# Patient Record
Sex: MALE | Race: WHITE | NOT HISPANIC OR LATINO
[De-identification: names, ages, dates, MRNs, and addresses within clinical notes are randomized per-mention and may not be internally consistent; named-entity substitution may affect disease eponyms.]

---

## 2017-01-31 ENCOUNTER — APPOINTMENT (OUTPATIENT)
Dept: RADIATION ONCOLOGY | Facility: CLINIC | Age: 67
End: 2017-01-31

## 2017-01-31 VITALS
DIASTOLIC BLOOD PRESSURE: 87 MMHG | SYSTOLIC BLOOD PRESSURE: 147 MMHG | OXYGEN SATURATION: 96 % | HEART RATE: 76 BPM | RESPIRATION RATE: 18 BRPM

## 2018-01-30 ENCOUNTER — APPOINTMENT (OUTPATIENT)
Dept: RADIATION ONCOLOGY | Facility: CLINIC | Age: 68
End: 2018-01-30
Payer: MEDICARE

## 2018-01-30 VITALS
BODY MASS INDEX: 29.16 KG/M2 | OXYGEN SATURATION: 98 % | WEIGHT: 175 LBS | TEMPERATURE: 97.5 F | HEIGHT: 65 IN | RESPIRATION RATE: 18 BRPM | HEART RATE: 65 BPM

## 2018-01-30 PROCEDURE — 99213 OFFICE O/P EST LOW 20 MIN: CPT

## 2018-02-28 ENCOUNTER — APPOINTMENT (OUTPATIENT)
Dept: UROLOGY | Facility: CLINIC | Age: 68
End: 2018-02-28
Payer: MEDICARE

## 2018-02-28 VITALS
DIASTOLIC BLOOD PRESSURE: 79 MMHG | SYSTOLIC BLOOD PRESSURE: 153 MMHG | OXYGEN SATURATION: 98 % | RESPIRATION RATE: 14 BRPM | HEART RATE: 87 BPM

## 2018-02-28 PROCEDURE — 99204 OFFICE O/P NEW MOD 45 MIN: CPT

## 2018-03-14 ENCOUNTER — APPOINTMENT (OUTPATIENT)
Dept: UROLOGY | Facility: CLINIC | Age: 68
End: 2018-03-14
Payer: MEDICARE

## 2018-03-14 VITALS — OXYGEN SATURATION: 98 % | SYSTOLIC BLOOD PRESSURE: 117 MMHG | DIASTOLIC BLOOD PRESSURE: 60 MMHG | TEMPERATURE: 98 F

## 2018-03-14 DIAGNOSIS — Z01.818 ENCOUNTER FOR OTHER PREPROCEDURAL EXAMINATION: ICD-10-CM

## 2018-03-14 PROCEDURE — 81003 URINALYSIS AUTO W/O SCOPE: CPT | Mod: QW

## 2018-03-14 PROCEDURE — 52000 CYSTOURETHROSCOPY: CPT

## 2018-03-15 ENCOUNTER — APPOINTMENT (OUTPATIENT)
Dept: UROLOGY | Facility: CLINIC | Age: 68
End: 2018-03-15
Payer: MEDICARE

## 2018-03-15 VITALS — SYSTOLIC BLOOD PRESSURE: 131 MMHG | HEART RATE: 73 BPM | DIASTOLIC BLOOD PRESSURE: 75 MMHG

## 2018-03-15 PROCEDURE — 51784 ANAL/URINARY MUSCLE STUDY: CPT

## 2018-03-15 PROCEDURE — 51798 US URINE CAPACITY MEASURE: CPT | Mod: 59

## 2018-03-15 PROCEDURE — 51728 CYSTOMETROGRAM W/VP: CPT

## 2018-03-15 PROCEDURE — 51797 INTRAABDOMINAL PRESSURE TEST: CPT

## 2018-03-17 LAB
BILIRUB UR QL STRIP: NEGATIVE
GLUCOSE UR-MCNC: NEGATIVE
HCG UR QL: 0.2 EU/DL
HGB UR QL STRIP.AUTO: NEGATIVE
KETONES UR-MCNC: NEGATIVE
LEUKOCYTE ESTERASE UR QL STRIP: NEGATIVE
NITRITE UR QL STRIP: NEGATIVE
PH UR STRIP: 6
PROT UR STRIP-MCNC: NEGATIVE
SP GR UR STRIP: 1.01

## 2019-01-29 ENCOUNTER — APPOINTMENT (OUTPATIENT)
Dept: RADIATION ONCOLOGY | Facility: CLINIC | Age: 69
End: 2019-01-29
Payer: MEDICARE

## 2019-01-29 VITALS
RESPIRATION RATE: 14 BRPM | DIASTOLIC BLOOD PRESSURE: 84 MMHG | BODY MASS INDEX: 28.49 KG/M2 | HEART RATE: 74 BPM | SYSTOLIC BLOOD PRESSURE: 154 MMHG | OXYGEN SATURATION: 95 % | WEIGHT: 171 LBS | HEIGHT: 65 IN

## 2019-01-29 PROCEDURE — 99213 OFFICE O/P EST LOW 20 MIN: CPT

## 2019-01-30 NOTE — LETTER CLOSING
[Consult Closing:] : Thank you for allowing me to participate in the care of this patient.  If you have any questions, please do not hesitate to contact me. [Sincerely yours,] : Sincerely yours, [FreeTextEntry3] : Jamarcus Bee MD

## 2019-01-30 NOTE — DISEASE MANAGEMENT
[Pathological] : TNM Stage: p [IIB] : IIB [FreeTextEntry4] : [place] 8 adenocarcinoma prostate  [TTNM] : 2c [NTNM] : 0 [MTNM] : 0

## 2019-01-30 NOTE — REVIEW OF SYSTEMS
[Nocturia] : nocturia [IPSS Score (0-40): ___] : IPSS score: [unfilled] [EPIC-CP Score (0-60): ___] : EPIC-CP score: [unfilled] [Anal Pain: Grade 0] : Anal Pain: Grade 0 [Constipation: Grade 0] : Constipation: Grade 0 [Diarrhea: Grade 0] : Diarrhea: Grade 0 [Rectal Pain: Grade 0] : Rectal Pain: Grade 0 [Edema Limbs: Grade 0] : Edema Limbs: Grade 0  [Fatigue: Grade 0] : Fatigue: Grade 0 [Localized Edema: Grade 0] : Localized Edema: Grade 0  [Neck Edema: Grade 0] : Neck Edema: Grade 0 [Hematuria: Grade 0] : Hematuria: Grade 0 [Urinary Incontinence: Grade 2 - Spontaneous; pads indicated; limiting instrumental ADL] : Urinary Incontinence: Grade 2 - Spontaneous; pads indicated; limiting instrumental ADL [Urinary Retention: Grade 0] : Urinary Retention: Grade 0 [Urinary Tract Pain: Grade 0] : Urinary Tract Pain: Grade 0 [Urinary Urgency: Grade 0] : Urinary Urgency: Grade 0 [Urinary Frequency: Grade 0] : Urinary Frequency: Grade 0 [Erectile Dysfunction: Grade 2 - Decrease in erectile function (frequency/rigidity of erections), erectile intervention indicated, (e.g., medication or mechanical devices such as penile pump)] : Erectile Dysfunction: Grade 2 - Decrease in erectile function (frequency/rigidity of erections), erectile intervention indicated, (e.g., medication or mechanical devices such as penile pump) [Ejaculation Disorder: Grade 2 - Anejaculation or retrograde ejaculation] : Ejaculation Disorder: Grade 2 - Anejaculation or retrograde ejaculation [Negative] : Allergic/Immunologic [FreeTextEntry8] : wears urinary external device during the day [FreeTextEntry2] : uses urinary catheter to leg bag drainage

## 2019-01-30 NOTE — PHYSICAL EXAM
[General Appearance - Well Nourished] : well nourished [General Appearance - Alert] : alert [General Appearance - In No Acute Distress] : in no acute distress [Affect] : the affect was normal [Mood] : the mood was normal [Not Anxious] : not anxious [Normal] : normal spine exam without palpable tenderness, no kyphosis or scoliosis [FreeTextEntry1] : deferred [de-identified] : wearing [place] cstheter

## 2019-01-30 NOTE — LETTER GREETING
[Dear Doctor] : Dear Doctor, [Follow-Up] : Your patient, [unfilled] was seen in my office today for follow-up [Please see my note below.] : Please see my note below. [FreeTextEntry2] : Dorota Hodgson MD\luis Gresham MD

## 2019-01-30 NOTE — HISTORY OF PRESENT ILLNESS
[FreeTextEntry1] : This 67 year-old male returns today s/p radiation medicine (7,020 cGy) for [id number] [place] 7 (4+3) adenocarcinoma prostate completed 11/12/12. PSA  1/10/19 -- 0.0 ng/ml, IPSS 8 EPIC 14.  Reports urinary incontinence when standing up, uses a urinary catheter to leg bag drainage, continues since surgery.  Denies hematuria, bony pain, fatigue & weight loss.\par

## 2020-02-04 ENCOUNTER — APPOINTMENT (OUTPATIENT)
Dept: RADIATION ONCOLOGY | Facility: CLINIC | Age: 70
End: 2020-02-04
Payer: MEDICARE

## 2020-02-04 VITALS
OXYGEN SATURATION: 95 % | DIASTOLIC BLOOD PRESSURE: 84 MMHG | SYSTOLIC BLOOD PRESSURE: 150 MMHG | HEART RATE: 75 BPM | TEMPERATURE: 98 F | BODY MASS INDEX: 28.99 KG/M2 | WEIGHT: 174 LBS | HEIGHT: 65 IN

## 2020-02-04 DIAGNOSIS — N52.9 MALE ERECTILE DYSFUNCTION, UNSPECIFIED: ICD-10-CM

## 2020-02-04 DIAGNOSIS — N39.3 STRESS INCONTINENCE (FEMALE) (MALE): ICD-10-CM

## 2020-02-04 PROCEDURE — 99213 OFFICE O/P EST LOW 20 MIN: CPT

## 2020-02-04 NOTE — HISTORY OF PRESENT ILLNESS
[FreeTextEntry1] : This 69 year-old male returns today s/p radiation medicine (7,020 cGy) for [id number] [place] 7 (4+3) adenocarcinoma prostate completed 11/12/12. \par \par Recent PSA done on 1/15/20 ---  0.1 ng/ml\par PSA  1/10/19 -- 0.0 ng/ml, \par  IPSS 0 EPIC 0 .  Chronic urinary incontinence post prostatectomy and no bladder control. uses external urinary [place] catheter to leg bag drainage, continues since surgery.  Denies hematuria, bony pain, fatigue & weight loss.\par \par Has recently changed medical insurance and plans to pursue urological surgery this year to restore continence.\par

## 2020-02-04 NOTE — VITALS
[Maximal Pain Intensity: 0/10] : 0/10 [ECOG Performance Status: 1 - Restricted in physically strenuous activity but ambulatory and able to carry out work of a light or sedentary nature] : Performance Status: 1 - Restricted in physically strenuous activity but ambulatory and able to carry out work of a light or sedentary nature, e.g., light house work, office work [Least Pain Intensity: 0/10] : 0/10 [80: Normal activity with effort; some signs or symptoms of disease.] : 80: Normal activity with effort; some signs or symptoms of disease.

## 2020-02-04 NOTE — REASON FOR VISIT
[Prostate Cancer] : prostate cancer [Routine Follow-Up] : routine follow-up visit for [Other: _____] : [unfilled] Rotation Flap Text: The defect edges were debeveled with a #15 scalpel blade.  Given the location of the defect, shape of the defect and the proximity to free margins a rotation flap was deemed most appropriate.  Using a sterile surgical marker, an appropriate rotation flap was drawn incorporating the defect and placing the expected incisions within the relaxed skin tension lines where possible.    The area thus outlined was incised deep to adipose tissue with a #15 scalpel blade.  The skin margins were undermined to an appropriate distance in all directions utilizing iris scissors.

## 2020-02-04 NOTE — DISEASE MANAGEMENT
[Pathological] : TNM Stage: p [IIB] : IIB [FreeTextEntry4] : [place] 8 adenocarcinoma prostate  [TTNM] : 2c [MTNM] : 0 [de-identified] : 7070cGy [NTNM] : 0 [de-identified] : prostate bed

## 2020-02-04 NOTE — PHYSICAL EXAM
[General Appearance - Well Nourished] : well nourished [General Appearance - In No Acute Distress] : in no acute distress [General Appearance - Alert] : alert [Mood] : the mood was normal [Normal] : no focal deficits [Affect] : the affect was normal [Not Anxious] : not anxious [FreeTextEntry1] : deferred [de-identified] : wearing [place] catheter

## 2020-02-04 NOTE — REVIEW OF SYSTEMS
[Nocturia] : nocturia [Negative] : Allergic/Immunologic [Anal Pain: Grade 0] : Anal Pain: Grade 0 [Constipation: Grade 0] : Constipation: Grade 0 [Rectal Pain: Grade 0] : Rectal Pain: Grade 0 [Diarrhea: Grade 0] : Diarrhea: Grade 0 [Edema Limbs: Grade 0] : Edema Limbs: Grade 0  [Fatigue: Grade 0] : Fatigue: Grade 0 [Neck Edema: Grade 0] : Neck Edema: Grade 0 [Localized Edema: Grade 0] : Localized Edema: Grade 0  [Hematuria: Grade 0] : Hematuria: Grade 0 [Urinary Retention: Grade 0] : Urinary Retention: Grade 0 [Urinary Tract Pain: Grade 0] : Urinary Tract Pain: Grade 0 [Urinary Frequency: Grade 0] : Urinary Frequency: Grade 0 [Erectile Dysfunction: Grade 2 - Decrease in erectile function (frequency/rigidity of erections), erectile intervention indicated, (e.g., medication or mechanical devices such as penile pump)] : Erectile Dysfunction: Grade 2 - Decrease in erectile function (frequency/rigidity of erections), erectile intervention indicated, (e.g., medication or mechanical devices such as penile pump) [Urinary Urgency: Grade 0] : Urinary Urgency: Grade 0 [Ejaculation Disorder: Grade 2 - Anejaculation or retrograde ejaculation] : Ejaculation Disorder: Grade 2 - Anejaculation or retrograde ejaculation [Urinary Incontinence: Grade 3 - Intervention indicated (e.g., clamp, collagen injections); operative intervention indicated; limiting self care ADL] : Urinary Incontinence: Grade 3 - Intervention indicated (e.g., clamp, collagen injections); operative intervention indicated; limiting self care ADL [IPSS Score (0-40): ___] : IPSS score: [unfilled] [EPIC-CP Score (0-60): ___] : EPIC-CP score: [unfilled] [FreeTextEntry8] : wears urinary external device during the day for chronic incontinence post prostatectomy [FreeTextEntry2] : uses external urinary catheter to leg bag drainage

## 2021-03-11 ENCOUNTER — APPOINTMENT (OUTPATIENT)
Dept: RADIATION ONCOLOGY | Facility: CLINIC | Age: 71
End: 2021-03-11
Payer: MEDICARE

## 2021-03-11 VITALS
OXYGEN SATURATION: 97 % | RESPIRATION RATE: 16 BRPM | HEART RATE: 84 BPM | BODY MASS INDEX: 29.42 KG/M2 | DIASTOLIC BLOOD PRESSURE: 82 MMHG | SYSTOLIC BLOOD PRESSURE: 133 MMHG | WEIGHT: 176.8 LBS

## 2021-03-11 PROCEDURE — 99072 ADDL SUPL MATRL&STAF TM PHE: CPT

## 2021-03-11 PROCEDURE — 99212 OFFICE O/P EST SF 10 MIN: CPT

## 2021-03-12 NOTE — PHYSICAL EXAM
[General Appearance - Well Nourished] : well nourished [General Appearance - Alert] : alert [General Appearance - In No Acute Distress] : in no acute distress [Affect] : the affect was normal [Mood] : the mood was normal [Not Anxious] : not anxious [Normal] : normal spine exam without palpable tenderness, no kyphosis or scoliosis [FreeTextEntry1] : deferred [de-identified] : [place] Catheter in place

## 2021-03-12 NOTE — ASSESSMENT
[No evidence of disease] : No evidence of disease [FreeTextEntry1] : treatment related sequelae status quo.

## 2021-03-12 NOTE — HISTORY OF PRESENT ILLNESS
[FreeTextEntry1] : This 70 year-old male will be seen today s/p radiation medicine (7,020 cGy) for [id number] Pilo 7 (4+3) adenocarcinoma of prostate, completed 11/12/12. \par \par PSA trend:\par 1/21/21 -- <0.1\par 1/15/20 --  0.1 ng/ml\par 1/10/19 -- 0.0 ng/ml, \par  \par Reports chronic urinary incontinence post prostatectomy with no bladder control.  Uses external urinary [place] catheter to leg bag drainage since surgery.  Denies hematuria, pain, fatigue & weight loss.\par \par Was planing to pursue urological surgery last year to restore continence but delayed due to pandemic.\par

## 2021-03-12 NOTE — REVIEW OF SYSTEMS
[Negative] : Allergic/Immunologic [Anal Pain: Grade 0] : Anal Pain: Grade 0 [Constipation: Grade 0] : Constipation: Grade 0 [Diarrhea: Grade 0] : Diarrhea: Grade 0 [Rectal Pain: Grade 0] : Rectal Pain: Grade 0 [Edema Limbs: Grade 0] : Edema Limbs: Grade 0  [Fatigue: Grade 0] : Fatigue: Grade 0 [Localized Edema: Grade 0] : Localized Edema: Grade 0  [Neck Edema: Grade 0] : Neck Edema: Grade 0 [Hematuria: Grade 0] : Hematuria: Grade 0 [Urinary Incontinence: Grade 2 - Spontaneous; pads indicated; limiting instrumental ADL] : Urinary Incontinence: Grade 2 - Spontaneous; pads indicated; limiting instrumental ADL [Urinary Retention: Grade 0] : Urinary Retention: Grade 0 [Urinary Tract Pain: Grade 0] : Urinary Tract Pain: Grade 0 [Urinary Urgency: Grade 0] : Urinary Urgency: Grade 0 [Urinary Frequency: Grade 0] : Urinary Frequency: Grade 0 [Erectile Dysfunction: Grade 2 - Decrease in erectile function (frequency/rigidity of erections), erectile intervention indicated, (e.g., medication or mechanical devices such as penile pump)] : Erectile Dysfunction: Grade 2 - Decrease in erectile function (frequency/rigidity of erections), erectile intervention indicated, (e.g., medication or mechanical devices such as penile pump) [Ejaculation Disorder: Grade 2 - Anejaculation or retrograde ejaculation] : Ejaculation Disorder: Grade 2 - Anejaculation or retrograde ejaculation [IPSS Score (0-40): ___] : IPSS score: [unfilled] [EPIC-CP Score (0-60): ___] : EPIC-CP score: [unfilled] [FreeTextEntry7] : 3-4 small bowel movements daily in am [FreeTextEntry8] : wears urinary external device 24/7 for chronic incontinence post prostatectomy [FreeTextEntry2] : uses external urinary catheter to leg bag drainage

## 2021-03-12 NOTE — DISEASE MANAGEMENT
[Pathological] : TNM Stage: p [IIB] : IIB [FreeTextEntry4] : [place] 8 adenocarcinoma prostate  [TTNM] : 2c [NTNM] : 0 [MTNM] : 0 [de-identified] : 7020 cGy [de-identified] : prostate bed

## 2022-03-15 ENCOUNTER — APPOINTMENT (OUTPATIENT)
Dept: RADIATION ONCOLOGY | Facility: CLINIC | Age: 72
End: 2022-03-15
Payer: MEDICARE

## 2022-03-15 VITALS
DIASTOLIC BLOOD PRESSURE: 84 MMHG | HEART RATE: 76 BPM | OXYGEN SATURATION: 98 % | BODY MASS INDEX: 29.12 KG/M2 | SYSTOLIC BLOOD PRESSURE: 154 MMHG | WEIGHT: 175 LBS | RESPIRATION RATE: 16 BRPM

## 2022-03-15 PROCEDURE — 99212 OFFICE O/P EST SF 10 MIN: CPT

## 2022-03-16 NOTE — REVIEW OF SYSTEMS
[IPSS Score (0-40): ___] : IPSS score: [unfilled] [EPIC-CP Score (0-60): ___] : EPIC-CP score: [unfilled] [Negative] : Allergic/Immunologic [Anal Pain: Grade 0] : Anal Pain: Grade 0 [Constipation: Grade 0] : Constipation: Grade 0 [Diarrhea: Grade 0] : Diarrhea: Grade 0 [Rectal Pain: Grade 0] : Rectal Pain: Grade 0 [Hematuria: Grade 0] : Hematuria: Grade 0 [Urinary Incontinence: Grade 2 - Spontaneous; pads indicated; limiting instrumental ADL] : Urinary Incontinence: Grade 2 - Spontaneous; pads indicated; limiting instrumental ADL [Urinary Retention: Grade 0] : Urinary Retention: Grade 0 [Urinary Tract Pain: Grade 0] : Urinary Tract Pain: Grade 0 [Urinary Urgency: Grade 0] : Urinary Urgency: Grade 0 [Urinary Frequency: Grade 0] : Urinary Frequency: Grade 0 [Erectile Dysfunction: Grade 2 - Decrease in erectile function (frequency/rigidity of erections), erectile intervention indicated, (e.g., medication or mechanical devices such as penile pump)] : Erectile Dysfunction: Grade 2 - Decrease in erectile function (frequency/rigidity of erections), erectile intervention indicated, (e.g., medication or mechanical devices such as penile pump) [Ejaculation Disorder: Grade 2 - Anejaculation or retrograde ejaculation] : Ejaculation Disorder: Grade 2 - Anejaculation or retrograde ejaculation [Edema Limbs: Grade 0] : Edema Limbs: Grade 0  [Fatigue: Grade 0] : Fatigue: Grade 0 [Localized Edema: Grade 0] : Localized Edema: Grade 0  [Neck Edema: Grade 0] : Neck Edema: Grade 0 [FreeTextEntry7] : 3-4 small bowel movements daily in am [FreeTextEntry8] : wears urinary external device 24/7 for chronic incontinence post prostatectomy [FreeTextEntry2] : uses external urinary catheter to leg bag drainage

## 2022-03-16 NOTE — ASSESSMENT
[No evidence of disease] : No evidence of disease [FreeTextEntry1] : treatment related sequelae status quo. Patient wants to pursue a remedy for incontinence that does not involve a foreign device.

## 2022-03-16 NOTE — HISTORY OF PRESENT ILLNESS
[FreeTextEntry1] :  71 y/o gentleman with hx of pT2 R0 N0 Pilo 3+4=7 prostate ca s/p RALRP 9/2011. Pt initially with undetectable PSA however daniela to 0.04 in 6/2012 and then 0/05. As a result, he was seen by rad med  and underwent salvage XRT (7020cGy)  completed in November2012.\par \par PSA trend:\par \par 2/23/22 -- 0.02 ng/mL\par 1/21/21 -- <0.1ng/mL\par 1/15/20 --  0.1 ng/ml\par 1/10/19 -- 0.0 ng/ml, \par  \par Reports chronic urinary incontinence post prostatectomy with no bladder control.  Uses external urinary [place] catheter to leg bag drainage since surgery.  Denies hematuria, pain, fatigue & weight loss.\par \par Was planing to pursue urological surgery last year to restore continence but delayed due to pandemic.\par

## 2022-03-16 NOTE — DISEASE MANAGEMENT
[2] : T2 [c] : c [0] : M0 [IIB] : IIB [Radical Prostatectomy] : Radical Prostatectomy [Radiation Therapy] : Radiation  Therapy [8] : [place] Score 8 [Pathological] : TNM Stage: p [FreeTextEntry4] : [place] 8 adenocarcinoma prostate  [TTNM] : 2c [MTNM] : 0 [NTNM] : 0 [de-identified] : 7020 cGy [de-identified] : prostate bed

## 2022-03-16 NOTE — PHYSICAL EXAM
[General Appearance - Well Nourished] : well nourished [General Appearance - Alert] : alert [General Appearance - In No Acute Distress] : in no acute distress [Normal] : oriented to person, place and time, the affect was normal, the mood was normal and not anxious [Affect] : the affect was normal [Mood] : the mood was normal [Not Anxious] : not anxious [FreeTextEntry1] : deferred [de-identified] : [place] Catheter in place

## 2022-04-20 ENCOUNTER — APPOINTMENT (OUTPATIENT)
Dept: UROLOGY | Facility: CLINIC | Age: 72
End: 2022-04-20
Payer: MEDICARE

## 2022-04-20 VITALS
SYSTOLIC BLOOD PRESSURE: 153 MMHG | HEART RATE: 93 BPM | WEIGHT: 170 LBS | OXYGEN SATURATION: 95 % | BODY MASS INDEX: 28.32 KG/M2 | HEIGHT: 65 IN | DIASTOLIC BLOOD PRESSURE: 85 MMHG

## 2022-04-20 PROCEDURE — 99213 OFFICE O/P EST LOW 20 MIN: CPT

## 2022-04-20 NOTE — PHYSICAL EXAM
[Normal Appearance] : normal appearance [Well Groomed] : well groomed [General Appearance - In No Acute Distress] : no acute distress [Edema] : no peripheral edema [Abdomen Soft] : soft [Urethral Meatus] : meatus normal [Normal Station and Gait] : the gait and station were normal for the patient's age [Skin Color & Pigmentation] : normal skin color and pigmentation [] : no rash [No Focal Deficits] : no focal deficits [Oriented To Time, Place, And Person] : oriented to person, place, and time [Affect] : the affect was normal [No Palpable Adenopathy] : no palpable adenopathy

## 2022-04-27 NOTE — ASSESSMENT
[FreeTextEntry1] : Referred patient to Dr. Chon Cervantes for an initial consultation for bladder sphincter surgery.\par Contact information given to patient at time of visit and advised him to schedule an appointment.

## 2022-04-27 NOTE — HISTORY OF PRESENT ILLNESS
[None] : no symptoms [FreeTextEntry1] : 71 yo presents today for an initial visit for prostate cancer.\par s/p RALP 2011 (GS 7) performed by Dr. Davenport.\par Salvage radiation 11/2012 with Dr. Kapadia. \par Pt. is experiencing chronic urinary incontinence and has no bladder control.\par He uses an external urinary [place] catheter to leg bag and is here to discuss incontinence management and treatment.\par

## 2023-03-14 ENCOUNTER — APPOINTMENT (OUTPATIENT)
Dept: RADIATION ONCOLOGY | Facility: CLINIC | Age: 73
End: 2023-03-14
Payer: MEDICARE

## 2023-03-14 VITALS
SYSTOLIC BLOOD PRESSURE: 157 MMHG | DIASTOLIC BLOOD PRESSURE: 72 MMHG | HEART RATE: 63 BPM | BODY MASS INDEX: 29.62 KG/M2 | WEIGHT: 178 LBS | OXYGEN SATURATION: 98 % | RESPIRATION RATE: 16 BRPM

## 2023-03-14 DIAGNOSIS — Z90.79 ACQUIRED ABSENCE OF OTHER GENITAL ORGAN(S): ICD-10-CM

## 2023-03-14 PROCEDURE — 99213 OFFICE O/P EST LOW 20 MIN: CPT

## 2023-03-16 PROBLEM — Z90.79 HISTORY OF RADICAL PROSTATECTOMY: Status: ACTIVE | Noted: 2017-01-31

## 2023-03-16 NOTE — HISTORY OF PRESENT ILLNESS
[FreeTextEntry1] :  73 y/o gentleman with hx of pT2 R0 N0 Pilo 3+4=7 prostate ca s/p RALRP 9/2011. Pt initially with undetectable PSA however daniela to 0.04 in 6/2012 and then 0/05.   As a result, he underwent salvage XRT (7020cGy)  completed in November 2012.\par \par PSA trend:\par 2/28/23 -- 0.0 ng/mL\par 2/23/22 -- 0.02 ng/mL\par 1/21/21 -- <0.1ng/mL\par 1/15/20 --  0.1 ng/mL\par 1/10/19 -- 0.0 ng/mL\par  \par Reports chronic urinary incontinence post prostatectomy with no bladder control.  Uses external urinary [place] catheter to leg bag drainage since surgery.  Denies hematuria, pain, fatigue & weight loss.\par \par Was planing to pursue urological surgery last year to restore continence but delayed due to pandemic.\par

## 2023-03-16 NOTE — DISEASE MANAGEMENT
[2] : T2 [c] : c [0] : M0 [IIB] : IIB [Radical Prostatectomy] : Radical Prostatectomy [Radiation Therapy] : Radiation  Therapy [8] : [place] Score 8 [Pathological] : TNM Stage: p [FreeTextEntry4] : [place] 8 adenocarcinoma prostate  [NTNM] : 0 [TTNM] : 2c [MTNM] : 0 [de-identified] : 7020 cGy [de-identified] : prostate bed

## 2023-03-16 NOTE — PHYSICAL EXAM
[General Appearance - Well Nourished] : well nourished [General Appearance - Alert] : alert [General Appearance - In No Acute Distress] : in no acute distress [Normal] : oriented to person, place and time, the affect was normal, the mood was normal and not anxious [Affect] : the affect was normal [Mood] : the mood was normal [Not Anxious] : not anxious [FreeTextEntry1] : deferred [de-identified] : [place] Catheter in place d/t incontinence

## 2023-03-16 NOTE — ASSESSMENT
[No evidence of disease] : No evidence of disease [FreeTextEntry1] : Stable treatment related sequelae.

## 2024-06-05 ENCOUNTER — APPOINTMENT (OUTPATIENT)
Dept: RADIATION ONCOLOGY | Facility: CLINIC | Age: 74
End: 2024-06-05
Payer: MEDICARE

## 2024-06-05 ENCOUNTER — NON-APPOINTMENT (OUTPATIENT)
Age: 74
End: 2024-06-05

## 2024-06-05 VITALS
SYSTOLIC BLOOD PRESSURE: 138 MMHG | OXYGEN SATURATION: 96 % | RESPIRATION RATE: 16 BRPM | DIASTOLIC BLOOD PRESSURE: 79 MMHG | HEART RATE: 65 BPM

## 2024-06-05 DIAGNOSIS — N39.45 CONTINUOUS LEAKAGE: ICD-10-CM

## 2024-06-05 DIAGNOSIS — Z92.3 PERSONAL HISTORY OF IRRADIATION: ICD-10-CM

## 2024-06-05 DIAGNOSIS — Z85.46 PERSONAL HISTORY OF MALIGNANT NEOPLASM OF PROSTATE: ICD-10-CM

## 2024-06-05 PROCEDURE — 99213 OFFICE O/P EST LOW 20 MIN: CPT

## 2024-06-06 PROBLEM — Z92.3 PERSONAL HISTORY OF RADIATION THERAPY: Status: ACTIVE | Noted: 2017-01-31

## 2024-06-06 PROBLEM — Z85.46 PERSONAL HISTORY OF PROSTATE CANCER: Status: ACTIVE | Noted: 2017-01-31

## 2024-06-06 NOTE — REVIEW OF SYSTEMS
[IPSS Score (0-40): ___] : IPSS score: [unfilled] [EPIC-CP Score (0-60): ___] : EPIC-CP score: [unfilled] [Negative] : Allergic/Immunologic [Anal Pain: Grade 0] : Anal Pain: Grade 0 [Constipation: Grade 0] : Constipation: Grade 0 [Diarrhea: Grade 0] : Diarrhea: Grade 0 [Rectal Pain: Grade 0] : Rectal Pain: Grade 0 [Hematuria: Grade 0] : Hematuria: Grade 0 [Urinary Incontinence: Grade 2 - Spontaneous; pads indicated; limiting instrumental ADL] : Urinary Incontinence: Grade 2 - Spontaneous; pads indicated; limiting instrumental ADL [Urinary Retention: Grade 0] : Urinary Retention: Grade 0 [Urinary Tract Pain: Grade 0] : Urinary Tract Pain: Grade 0 [Urinary Urgency: Grade 0] : Urinary Urgency: Grade 0 [Urinary Frequency: Grade 0] : Urinary Frequency: Grade 0 [Erectile Dysfunction: Grade 2 - Decrease in erectile function (frequency/rigidity of erections), erectile intervention indicated, (e.g., medication or mechanical devices such as penile pump)] : Erectile Dysfunction: Grade 2 - Decrease in erectile function (frequency/rigidity of erections), erectile intervention indicated, (e.g., medication or mechanical devices such as penile pump) [Ejaculation Disorder: Grade 2 - Anejaculation or retrograde ejaculation] : Ejaculation Disorder: Grade 2 - Anejaculation or retrograde ejaculation [Edema Limbs: Grade 0] : Edema Limbs: Grade 0  [Fatigue: Grade 0] : Fatigue: Grade 0 [Localized Edema: Grade 0] : Localized Edema: Grade 0  [Neck Edema: Grade 0] : Neck Edema: Grade 0 [FreeTextEntry7] : 3-4 small bowel movements daily in am [FreeTextEntry8] : wears urinary external device ( [place] Catheter) 24/7 for chronic incontinence post prostatectomy [FreeTextEntry2] : uses external urinary catheter to leg bag drainage

## 2024-06-06 NOTE — PHYSICAL EXAM
[General Appearance - Well Nourished] : well nourished [Normal] : oriented to person, place and time, the affect was normal, the mood was normal and not anxious [Affect] : the affect was normal [Mood] : the mood was normal [Not Anxious] : not anxious [FreeTextEntry1] : deferred [de-identified] : [place] Catheter in place d/t incontinence

## 2024-06-06 NOTE — HISTORY OF PRESENT ILLNESS
[FreeTextEntry1] :  75 y/o gentleman with hx of pT2 R0 N0 Pilo 3+4=7 prostate adenocarcinoma, s/p RALRP 9/2011. Pt initially with undetectable PSA however daniela to 0.04 in 6/2012.   As a result, he underwent salvage XRT (7020cGy) completed in November 2012.  PSA trend: 2/28/24 -- 0.0 ng/mL 2/28/23 -- 0.0 ng/mL 2/23/22 -- 0.02 ng/mL 1/21/21 -- <0.1ng/mL 1/15/20 --  0.1 ng/mL 1/10/19 -- 0.0 ng/mL   Reports chronic urinary incontinence post prostatectomy with no bladder control.  Uses external urinary [place] catheter to leg bag drainage since surgery.  Denies hematuria, pain, fatigue & weight loss.

## 2024-06-06 NOTE — DISEASE MANAGEMENT
[2] : T2 [c] : c [0] : M0 [IIB] : IIB [Radical Prostatectomy] : Radical Prostatectomy [Radiation Therapy] : Radiation  Therapy [8] : [place] Score 8 [Pathological] : TNM Stage: p [FreeTextEntry4] : [place] 8 adenocarcinoma prostate  [TTNM] : 2c [NTNM] : 0 [MTNM] : 0 [de-identified] : 7020 cGy [de-identified] : prostate bed

## 2025-03-11 ENCOUNTER — NON-APPOINTMENT (OUTPATIENT)
Age: 75
End: 2025-03-11

## 2025-03-11 ENCOUNTER — APPOINTMENT (OUTPATIENT)
Dept: RADIATION ONCOLOGY | Facility: CLINIC | Age: 75
End: 2025-03-11
Payer: MEDICARE

## 2025-03-11 VITALS
DIASTOLIC BLOOD PRESSURE: 71 MMHG | RESPIRATION RATE: 16 BRPM | OXYGEN SATURATION: 99 % | SYSTOLIC BLOOD PRESSURE: 122 MMHG | HEART RATE: 77 BPM | WEIGHT: 164 LBS | HEIGHT: 65 IN | BODY MASS INDEX: 27.32 KG/M2

## 2025-03-11 DIAGNOSIS — Z85.46 PERSONAL HISTORY OF MALIGNANT NEOPLASM OF PROSTATE: ICD-10-CM

## 2025-03-11 DIAGNOSIS — Z92.3 PERSONAL HISTORY OF IRRADIATION: ICD-10-CM

## 2025-03-11 DIAGNOSIS — N52.9 MALE ERECTILE DYSFUNCTION, UNSPECIFIED: ICD-10-CM

## 2025-03-11 DIAGNOSIS — Z90.79 ACQUIRED ABSENCE OF OTHER GENITAL ORGAN(S): ICD-10-CM

## 2025-03-11 DIAGNOSIS — N39.45 CONTINUOUS LEAKAGE: ICD-10-CM

## 2025-03-11 PROCEDURE — 99212 OFFICE O/P EST SF 10 MIN: CPT

## 2025-06-11 ENCOUNTER — OFFICE (OUTPATIENT)
Dept: URBAN - METROPOLITAN AREA CLINIC 104 | Facility: CLINIC | Age: 75
Setting detail: OPHTHALMOLOGY
End: 2025-06-11
Payer: MEDICARE

## 2025-06-11 DIAGNOSIS — H25.13: ICD-10-CM

## 2025-06-11 DIAGNOSIS — H43.813: ICD-10-CM

## 2025-06-11 DIAGNOSIS — H25.813: ICD-10-CM

## 2025-06-11 DIAGNOSIS — H35.372: ICD-10-CM

## 2025-06-11 PROCEDURE — 92134 CPTRZ OPH DX IMG PST SGM RTA: CPT | Performed by: OPTOMETRIST

## 2025-06-11 PROCEDURE — 92004 COMPRE OPH EXAM NEW PT 1/>: CPT | Performed by: OPTOMETRIST

## 2025-06-11 ASSESSMENT — REFRACTION_MANIFEST
OD_AXIS: 087
OD_CYLINDER: -2.00
OS_AXIS: 001
OD_VA1: 20/30-
OS_VA1: 20/30-
OS_SPHERE: +1.50
OD_SPHERE: +2.00
OS_CYLINDER: -0.75

## 2025-06-11 ASSESSMENT — VISUAL ACUITY
OS_BCVA: 20/30-2
OD_BCVA: 20/70+

## 2025-06-11 ASSESSMENT — REFRACTION_CURRENTRX
OS_OVR_VA: 20/
OD_OVR_VA: 20/
OS_SPHERE: +3.00
OD_SPHERE: +3.00
OS_VPRISM_DIRECTION: SV
OD_VPRISM_DIRECTION: SV

## 2025-06-11 ASSESSMENT — REFRACTION_AUTOREFRACTION
OD_AXIS: 087
OD_SPHERE: +2.00
OS_SPHERE: +1.50
OD_CYLINDER: -2.00
OS_AXIS: 001
OS_CYLINDER: -0.75

## 2025-06-11 ASSESSMENT — CONFRONTATIONAL VISUAL FIELD TEST (CVF)
OD_FINDINGS: FULL
OS_FINDINGS: FULL

## 2025-06-11 ASSESSMENT — TONOMETRY
OD_IOP_MMHG: 14
OS_IOP_MMHG: 14

## 2025-06-11 ASSESSMENT — KERATOMETRY
OD_AXISANGLE_DEGREES: 178
OS_AXISANGLE_DEGREES: 020
OS_K1POWER_DIOPTERS: 43.49
OD_K1POWER_DIOPTERS: 43.21
OS_K2POWER_DIOPTERS: 44.06
OD_K2POWER_DIOPTERS: 43.89

## 2025-07-10 ENCOUNTER — OFFICE (OUTPATIENT)
Dept: URBAN - METROPOLITAN AREA CLINIC 104 | Facility: CLINIC | Age: 75
Setting detail: OPHTHALMOLOGY
End: 2025-07-10
Payer: MEDICARE

## 2025-07-10 DIAGNOSIS — H25.13: ICD-10-CM

## 2025-07-10 DIAGNOSIS — H25.12: ICD-10-CM

## 2025-07-10 PROCEDURE — 99214 OFFICE O/P EST MOD 30 MIN: CPT | Performed by: OPHTHALMOLOGY

## 2025-07-10 PROCEDURE — 92136 OPHTHALMIC BIOMETRY: CPT | Mod: TC | Performed by: OPHTHALMOLOGY

## 2025-07-10 PROCEDURE — 92136 OPHTHALMIC BIOMETRY: CPT | Mod: LT | Performed by: OPHTHALMOLOGY

## 2025-07-10 ASSESSMENT — KERATOMETRY
OD_K2POWER_DIOPTERS: 43.95
OD_K2POWER_DIOPTERS: 43.95
OS_K1POWER_DIOPTERS: 43.77
OS_AXISANGLE2_DEGREES: 071
OD_AXISANGLE2_DEGREES: 007
OS_K1POWER_DIOPTERS: 43.77
OS_CYLAXISANGLE_DEGREES: 071
OS_K2POWER_DIOPTERS: 44.06
OD_CYLAXISANGLE_DEGREES: 007
OD_K1K2_AVERAGE: 43.61
OS_K2POWER_DIOPTERS: 44.06
OS_K1K2_AVERAGE: 43.91
OS_CYLPOWER_DEGREES: 0.29
OD_K1POWER_DIOPTERS: 43.27
OD_AXISANGLE_DEGREES: 007
OS_AXISANGLE_DEGREES: 071
OS_AXISANGLE_DEGREES: 161
OD_CYLPOWER_DEGREES: 0.68
OD_AXISANGLE_DEGREES: 97
OD_K1POWER_DIOPTERS: 43.27

## 2025-07-10 ASSESSMENT — VISUAL ACUITY
OS_BCVA: 20/40
OD_BCVA: 20/70+

## 2025-07-10 ASSESSMENT — REFRACTION_CURRENTRX
OD_SPHERE: +3.00
OS_OVR_VA: 20/
OD_VPRISM_DIRECTION: SV
OD_OVR_VA: 20/
OS_SPHERE: +3.00
OS_VPRISM_DIRECTION: SV

## 2025-07-10 ASSESSMENT — REFRACTION_MANIFEST
OD_SPHERE: +2.00
OD_CYLINDER: -2.00
OS_SPHERE: +1.50
OD_AXIS: 087
OS_AXIS: 001
OD_VA1: 20/30-
OS_CYLINDER: -0.75
OS_VA1: 20/30-

## 2025-07-10 ASSESSMENT — LID POSITION - PTOSIS
OS_PTOSIS: LUL 1+
OD_PTOSIS: RUL 1+

## 2025-07-10 ASSESSMENT — REFRACTION_AUTOREFRACTION
OD_CYLINDER: -1.75
OS_CYLINDER: -0.50
OD_SPHERE: +2.25
OS_AXIS: 163
OD_AXIS: 078
OS_SPHERE: +1.00

## 2025-07-10 ASSESSMENT — CONFRONTATIONAL VISUAL FIELD TEST (CVF)
OD_FINDINGS: FULL
OS_FINDINGS: FULL